# Patient Record
(demographics unavailable — no encounter records)

---

## 2025-01-13 NOTE — CONSULT LETTER
[Dear  ___] : Dear  [unfilled], [Consult Letter:] : I had the pleasure of evaluating your patient, [unfilled]. [Please see my note below.] : Please see my note below. [Consult Closing:] : Thank you very much for allowing me to participate in the care of this patient.  If you have any questions, please do not hesitate to contact me. [Sincerely,] : Sincerely, [FreeTextEntry2] : Dr. Tenisha Leger  [address] [place] [id number]  [FreeTextEntry3] : Tanya Kendrick MD   Pediatric Otolaryngology/ Head & Neck Surgery [facility] , Otolaryngology; [facility]  [facility] [place] [address] Tel [phone] Fax [phone]

## 2025-01-13 NOTE — BIRTH HISTORY
[At ___ Weeks Gestation] : at [unfilled] weeks gestation [ Section] : by  section [None] : No maternal complications [Passed] : passed [de-identified] : Twin delivery and heart deceleration  [de-identified] : NO NICU

## 2025-01-13 NOTE — HISTORY OF PRESENT ILLNESS
[de-identified] : This child presents with noisy breathing since 1 week after birth (?inspiratory stridor).   It has worsened especially with feeds, agitation, and laying flat.  The patient does not have spit ups.  There is no history of snoring, mouth breathing or witnessed apnea. No throat/tonsil infections. No problems with swallowing or with VPI/Speech/nasal regurgitation.  Passed NBHT AU. Full term,  uncomplicated delivery with uncomplicated pregnancy. No cyanosis, no ETT intubation, no home oxygen requirement, no NICU stay.

## 2025-01-13 NOTE — ASSESSMENT
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 At this time I would plan to proceed with expectant management (i explained the r/b/a of head of bed elevation) and follow up in 2-3 months or sooner if symptoms worsen (can consider high dose 5mg/kg BID Axid/pepcid/feeding evaluation vs. higher dose nizatidine or PPI if needed/supraglottoplasty if worsening). RTC sooner if warning signs/symptoms worsen (can go to ER if sxs such as worsening SOB/resp distress/cyanosis). I gave an instructional sheet for lifestyle modifications and teaching about the condition.    fu as planned scope at fu given significant arytenoid hooding.

## 2025-02-07 NOTE — CONSULT LETTER
[FreeTextEntry2] : Dr. Tenisha Leger  [address] [place] [id number]   [FreeTextEntry3] : Tanya Kendrick MD   Pediatric Otolaryngology/ Head & Neck Surgery [facility] , Otolaryngology; [facility]  [facility] [place] [address] Tel [phone] Fax [phone]

## 2025-02-07 NOTE — ASSESSMENT
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 Given no improvement in sxs I do recommend a SLP and GI team eval. Pulm team eval if sleep sxs worsen and fu as planned scope at fu given significant arytenoid hooding Will d/w GI/pcp regarding addition of pepcid.

## 2025-02-07 NOTE — HISTORY OF PRESENT ILLNESS
[de-identified] : This child presents for a follow up of laryngomalacia Mom feels stridor feels worse from last visit It is now disturbing her when she is sleeping She has not been finishing her bottle within the last week - she typically leaves around 1oz. however still gaining weight The patient does not have spit ups. There is occasional mouth breathing  Mom feels shes fighting to catch her breath when sleeping but unsure if there are any apneas  No throat/tonsil infections. No problems with swallowing or with VPI/Speech/nasal regurgitation. Passed NBHT AU. Full term,  uncomplicated delivery with uncomplicated pregnancy. No cyanosis, no ETT intubation, no home oxygen requirement, no NICU stay.
MD

## 2025-02-13 NOTE — HISTORY OF PRESENT ILLNESS
[Mother] : mother [Home] : at home, [unfilled] , at the time of the visit. [Medical Office: ([facility])___] : at the medical office located in  [Telehealth (audio & video)] : This visit was provided via telehealth using real-time 2-way audio visual technology. [FreeTextEntry3] : mother [de-identified] : 2 month old born 36 weeks and 1 day, twin A, no complications, no NICU stay required, passed meconium in 24 hours of life with tracheomalacia presents for presents for GI portion of CARE (comprehensive, airway, respiratory and esophageal) team evaluation.  Feeding enfamil gentle ease 1-3oz/feed every 3 hours, may skip a feed overnight. Evaluated by SLP 2/15 who felt signs of LEONARD and asked to use pulverized oatmeal cereal 2 tsp in 3oz bottle twice daily. Doing well with those thickened bottles. Uncomfortable with feeds resolved with thickening. May make bubbles of saliva. No reflux, regurgitation, spitting. Famotidine started this morning 0.5mL once daily. BM 2-3 times a day, watery to mushy, no visible blood or mucous. No URI, AOM, PNA, throat infections, croup, sinusitis. Generally a happy baby.

## 2025-02-13 NOTE — ASSESSMENT
[Educated Patient & Family about Diagnosis] : educated the patient and family about the diagnosis [FreeTextEntry1] : 2 month old born 36 weeks and 1 day, twin A, no complications, no NICU stay required, passed meconium in 24 hours of life presents for presents for GI portion of CARE (comprehensive, airway, respiratory and esophageal) team evaluation.  Recommend: -LEONARD precautions -Change Famotidine to 0.3mL twice daily, will refill when required -Thicken all bottles 2 tsp to 3oz of formula -Consider hydrolyzed formula if issues persist -Weight gain appears excellent if suboptimal at PMD appointment will concentrate to 24kcal/mL -Call with questions, concerns, or clinical change -Follow-up in 8 weeks in office or telehealth ok as long as accurate and recent weight available

## 2025-02-13 NOTE — CONSULT LETTER
[Dear  ___] : Dear  [unfilled], [Consult Letter:] : I had the pleasure of evaluating your patient, [unfilled]. [Please see my note below.] : Please see my note below. [Consult Closing:] : Thank you very much for allowing me to participate in the care of this patient.  If you have any questions, please do not hesitate to contact me. [Sincerely,] : Sincerely, [DrJuana  ___] : Dr. CARVAJAL [FreeTextEntry3] : Jhonny Godinez DO, MSc   of Comprehensive Airway, Respiratory and Esophageal Team Division of Pediatric Gastroenterology, Liver Disease and Nutrition [facility]

## 2025-02-13 NOTE — PHYSICAL EXAM
[Well Developed] : well developed [Well Nourished] : well nourished [NAD] : in no acute distress [Alert and Active] : alert and active [Rectal Exam Deferred] : rectal exam was deferred [Normal Tone] : normal tone [Well-Perfused] : well-perfused [Interactive] : interactive [icteric] : anicteric [Respiratory Distress] : no respiratory distress  [Distended] : non distended [Rash] : no rash [Jaundice] : no jaundice [FreeTextEntry4] : noisy breathing

## 2025-02-25 NOTE — HISTORY OF PRESENT ILLNESS
[FreeTextEntry1] : CHRISTIANO is a 2 month old girl with laryngomalacia (stridor) here for evaluation. Born at 36 weeks, noisy breathing since 1wo, worsens with agitation and laying down.  INITIAL HISTORY 2/24/2025 ENT: seen by Dr. Kendrick, laryngomalacia present. Ongoing evaluation and decision for scope, possible CARE triple scope. GI: seen by Dr. Godinez, increased Famotidine dose. Being considered for CARE procedure/triple scope. ST: switched to cereal consistency. PULM: denies any colds/URI since birth. No baseline cough, difficult or fast breathing.   INITIAL RESPIRATORY HISTORY 02/23/2025 - Respiratory symptoms when well: Noisy breathing, sometimes pulling in chest and neck. - Bacterial infections/Antibiotics: Denies - Hospitalizations/ER visits: ED for retracting before dx of laryngomalacia - Albuterol use: Denies - Steroids use: Denies - Reflux/Aspiration symptoms: + on Famotidine - Vaccination status: Up to date - Sleep symptoms: Typical for infant  - Previous intubation: Denies - Gestational age - Development: 36 weeks - Feeding regimen: Enfamil Emily ease  - Medications taking: Famotidine, Nystatin  - [facility] asthma: Mom and Grandma - Allergy symptoms (chronic nasal congestion): Denies - Eczema: + per pediatrician

## 2025-02-25 NOTE — PHYSICAL EXAM
[Well Nourished] : well nourished [Well Developed] : well developed [Alert] : ~L alert [Active] : active [Normal Breathing Pattern] : normal breathing pattern [No Respiratory Distress] : no respiratory distress [No Allergic Shiners] : no allergic shiners [No Drainage] : no drainage [No Conjunctivitis] : no conjunctivitis [No Oral Pallor] : no oral pallor [No Oral Cyanosis] : no oral cyanosis [Absence Of Retractions] : absence of retractions [Symmetric] : symmetric [Good Expansion] : good expansion [No Acc Muscle Use] : no accessory muscle use [Good aeration to bases] : good aeration to bases [Equal Breath Sounds] : equal breath sounds bilaterally [No Crackles] : no crackles [No Rhonchi] : no rhonchi [No Wheezing] : no wheezing [Normal Sinus Rhythm] : normal sinus rhythm [No Heart Murmur] : no heart murmur [Soft, Non-Tender] : soft, non-tender [No Hepatosplenomegaly] : no hepatosplenomegaly [Non Distended] : was not ~L distended [Abdomen Mass (___ Cm)] : no abdominal mass palpated [Full ROM] : full range of motion [No Clubbing] : no clubbing [Capillary Refill < 2 secs] : capillary refill less than two seconds [No Cyanosis] : no cyanosis [No Petechiae] : no petechiae [No Kyphoscoliosis] : no kyphoscoliosis [No Contractures] : no contractures [Alert and  Oriented] : alert and oriented [No Abnormal Focal Findings] : no abnormal focal findings [Normal Muscle Tone And Reflexes] : normal muscle tone and reflexes [No Birth Marks] : no birth marks [No Rashes] : no rashes [No Skin Lesions] : no skin lesions [FreeTextEntry5] : +supine: pronounced inspiratory stridor. [FreeTextEntry6] : +no tachypnea, retractions or difficulty breathing. [FreeTextEntry7] : +clear lung sounds, aside from referred upper respiratory sounds

## 2025-02-25 NOTE — DATA REVIEWED
[FreeTextEntry1] : I personally reviewed records, documentation and images (findings included into my note), including: - Records from Dr. Tanya Kendrick dated 2/7/2025. - Records from Dr. Jhonny Godinez dated 2/13/2025. - No images to review.

## 2025-02-25 NOTE — ASSESSMENT
[FreeTextEntry1] : CHRISTIANO, 2 month old with laryngomalacia referred for evaluation. Patient's history and physical exam are consistent with laryngomalacia without any lower respiratory involvement. Denies any recurring cough, wheezing or difficulty breathing. No viral infections up to now. Asthma risk factors may increase risk for the development of Reactive Airway Disease (RAD)/Asthma. While patient's history so far does not support RAD, I will have low threshold to start bronchodilators if patient were to develop respiratory distress related with wheezing.   Laryngomalacia diagnosed by ENT with dysphagia, now on thickened diet, and potential aspiration. Patient benefits from the use of antacids as prescribed by GI. Ongoing evaluation by ENT to determine the need for triple scope/CARE procedure. Low suspicion for lower airway abnormalities, regardless a flexible bronchoscopy may be considered pending further evaluation.   Today's thorough evaluation suggests no other respiratory pathology; alternative diagnosis may still be considered. Discussed above assessment, management plan and potential medication side effects. Parent agreed with plan. All queries were answered. Evaluation includes normal saturation. Time excludes separately reported services.   Recommend: - Continue management per ENT. - Agree with thickened feeds and antacids per ST and GI. - Monitor evolution. Determining need for CARE/Triple scope procedure. - Low threshold for bronchodilator initiation PRN if presents signs or symptoms of reactive airway. Discussed benefits/risks of flexible bronchoscopies. - Follow-up in 6 months.

## 2025-02-25 NOTE — REASON FOR VISIT
[Initial Evaluation] : an initial evaluation of [Mother] : mother [Other: _____] : [unfilled] [FreeTextEntry2] : laryngomalacia, stridor

## 2025-02-25 NOTE — CONSULT LETTER
[Dear  ___] : Dear  [unfilled], [Consult Letter:] : I had the pleasure of evaluating your patient, [unfilled]. [Please see my note below.] : Please see my note below. [Consult Closing:] : Thank you very much for allowing me to participate in the care of this patient.  If you have any questions, please do not hesitate to contact me. [Sincerely,] : Sincerely, [FreeTextEntry3] : Wero Monte MD Attending Physician, Division of Pediatric Pulmonology.

## 2025-02-25 NOTE — HISTORY OF PRESENT ILLNESS
[FreeTextEntry1] : CHRISTIANO is a 2 month old girl with laryngomalacia (stridor) here for evaluation. Born at 36 weeks, noisy breathing since 1wo, worsens with agitation and laying down.  INITIAL HISTORY 2/24/2025 ENT: seen by Dr. Kendrick, laryngomalacia present. Ongoing evaluation and decision for scope, possible CARE triple scope. GI: seen by Dr. Godinez, increased Famotidine dose. Being considered for CARE procedure/triple scope. ST: switched to cereal consistency. PULM: denies any colds/URI since birth. No baseline cough, difficult or fast breathing.   INITIAL RESPIRATORY HISTORY 02/23/2025 - Respiratory symptoms when well: Noisy breathing, sometimes pulling in chest and neck. - Bacterial infections/Antibiotics: Denies - Hospitalizations/ER visits: ED for retracting before dx of laryngomalacia - Albuterol use: Denies - Steroids use: Denies - Reflux/Aspiration symptoms: + on Famotidine - Vaccination status: Up to date - Sleep symptoms: Typical for infant  - Previous intubation: Denies - Gestational age - Development: 36 weeks - Feeding regimen: Enfamil Emily ease  - Medications taking: Famotidine, Nystatin  - [facility] asthma: Mom and Grandma - Allergy symptoms (chronic nasal congestion): Denies - Eczema: + per pediatrician

## 2025-03-10 NOTE — HISTORY OF PRESENT ILLNESS
[de-identified] : This child presents for a follow up of laryngomalacia Mom feels stridor is improving. GI recently prescribed thickened formula. Continues on famotidine  Sleep is improving.  Finishing bottles better. The patient does not have spit ups. There is occasional mouth breathing Occasionally still trying to catch breath, but no other noted dyspnea.  Denies recent fevers or infections  No throat/tonsil infections. No problems with swallowing or with VPI/Speech/nasal regurgitation. Passed NBHT AU. Full term, uncomplicated delivery with uncomplicated pregnancy. No cyanosis, no ETT intubation, no home oxygen requirement, no NICU stay.

## 2025-03-10 NOTE — CONSULT LETTER
[Dear  ___] : Dear  [unfilled], [Consult Letter:] : I had the pleasure of evaluating your patient, [unfilled]. [Please see my note below.] : Please see my note below. [Consult Closing:] : Thank you very much for allowing me to participate in the care of this patient.  If you have any questions, please do not hesitate to contact me. [Sincerely,] : Sincerely, [FreeTextEntry2] :     Dr. Tenisha Leger  [address] [place] [id number] [FreeTextEntry3] : Tanya Kendrick MD   Pediatric Otolaryngology/ Head & Neck Surgery [facility] , Otolaryngology; [facility]  [facility] [place] [address] Tel [phone] Fax [phone]

## 2025-03-10 NOTE — ASSESSMENT
[FreeTextEntry1] : This child presents with laryngomalacia and sig signs of LPR but improving sxs on faotidine and thickened diet fu as planned given improvement. given cannot see full VF mobility with swelling will scope at fu.

## 2025-03-10 NOTE — REASON FOR VISIT
[Subsequent Evaluation] : a subsequent evaluation for [FreeTextEntry2] : stridor and noisy breathing  [Mother] : mother

## 2025-04-16 NOTE — HISTORY OF PRESENT ILLNESS
[Mother] : mother [Home] : at home, [unfilled] , at the time of the visit. [Medical Office: ([facility])___] : at the medical office located in  [Telehealth (audio & video)] : This visit was provided via telehealth using real-time 2-way audio visual technology. [FreeTextEntry3] : mother [de-identified] : 2 month old born 36 weeks and 1 day, twin A, no complications, no NICU stay required, passed meconium in 24 hours of life with tracheomalacia presents for presents for GI portion of CARE (comprehensive, airway, respiratory and esophageal) team evaluation.  -LEONARD precautions -Change Famotidine to 0.3mL twice daily, will refill when required -Thicken all bottles 2 tsp to 3oz of formula -Consider hydrolyzed formula if issues persist -Call with questions, concerns, or clinical change -Follow-up in 8 weeks in office or telehealth ok as long as accurate and recent weight available   Feeding enfamil gentle ease 1-3oz/feed every 3 hours, may skip a feed overnight. Evaluated by SLP 2/15 who felt signs of LEONARD and asked to use pulverized oatmeal cereal 2.5 tsp in 4oz bottle, 5-6 bottles daily. Doing well with those thickened bottles. Uncomfortable with feeds resolved with thickening. May make bubbles of saliva. No reflux, regurgitation, spitting. Famotidine started this morning 0.3mL once daily. BM 2-3 times a day, watery to mushy, no visible blood or mucous. No URI, AOM, PNA, throat infections, croup, sinusitis. Generally a happy baby.

## 2025-04-16 NOTE — CONSULT LETTER
[Dear  ___] : Dear  [unfilled], [Consult Letter:] : I had the pleasure of evaluating your patient, [unfilled]. [Please see my note below.] : Please see my note below. [Consult Closing:] : Thank you very much for allowing me to participate in the care of this patient.  If you have any questions, please do not hesitate to contact me. [Sincerely,] : Sincerely, [DrJuana  ___] : Dr. CARVAJAL [FreeTextEntry3] : Jhonny Godinez DO, MSc   of Comprehensive Airway, Respiratory and Esophageal Team Division of Pediatric Gastroenterology, Liver Disease and Nutrition [facility]

## 2025-04-16 NOTE — ASSESSMENT
[Educated Patient & Family about Diagnosis] : educated the patient and family about the diagnosis [FreeTextEntry1] : 2 month old born 36 weeks and 1 day, twin A, no complications, no NICU stay required, passed meconium in 24 hours of life presents for presents for GI portion of CARE (comprehensive, airway, respiratory and esophageal) team evaluation.  Recommend: -LEONARD precautions -Increase Famotidine to 0.3mL and 0.4mL -Thicken all bottles -Consider puree at 4 months corrected -Weight gain appears excellent if suboptimal at PMD appointment will concentrate to 24kcal/mL -Call with questions, concerns, or clinical change -Follow-up in 8 weeks in office or telehealth ok as long as accurate and recent weight available

## 2025-06-09 NOTE — CONSULT LETTER
Pharmacy requesting refill on Lasix   Last seen 5/8/25  Has appt 6/10/25       [Dear  ___] : Dear  [unfilled], [Consult Letter:] : I had the pleasure of evaluating your patient, [unfilled]. [Please see my note below.] : Please see my note below. [Consult Closing:] : Thank you very much for allowing me to participate in the care of this patient.  If you have any questions, please do not hesitate to contact me. [Sincerely,] : Sincerely, [FreeTextEntry3] : Tanya Kendrick MD   Pediatric Otolaryngology/ Head & Neck Surgery [facility] , Otolaryngology; [facility]  [facility] [place] [address] Tel [phone] Fax [phone]   [FreeTextEntry2] : Dr. Tenisha Leger  [address] [place] [phone]

## 2025-06-09 NOTE — REASON FOR VISIT
[Subsequent Evaluation] : a subsequent evaluation for [Mother] : mother [FreeTextEntry2] : stridor and noisy breathing

## 2025-06-09 NOTE — ASSESSMENT
[FreeTextEntry1] : This child presents with a history of laryngomalacia  I have also discussed with the family:      1.  A sleep study/overnight polysomnogram evaluation vs. observation.       2.  Given the patient's corresponding history and physical examination findings, I think that it is reasonable to proceed with a supraglottoplasty if symptoms persist. I have explained the risks of supraglottoplasty  including but not limited to general anesthesia, bleeding, infection, failure to extubate, injury to the teeth/lips/gums/local structures, recurrent laryngomalacia, persistence of symptoms, dysphagia, swallowing dysfunction, continuaed aspiration/penetration, stenosis,  post-operative hemorrhage, prolonged intubation, possible need for further procedures. I have discussed with the family and offered two options to proceed.  The family opts for a preop MBSS, SLP eval and if no improvement in clinical worsening post cold consider SGP.  May need thickend liquids despite surgery. If surgery booked can ask Dr. Godinez to join,  If 8 weeks post cold doing much better consider just observation on thickened diet and famotidine.

## 2025-06-09 NOTE — HISTORY OF PRESENT ILLNESS
[de-identified] : 6 month old here for a follow up of laryngomalacia Mom feels stridor is improving. GI recently prescribed thickened formula. Continues on famotidine daily. Sleep improved but recently started waking up a few times during the night. Feeding has improved and started on purees  The patient does not have spit ups. Coughing/choking right after completing formula since her cold 1 month ago. Does not do this with purees Occasionally still trying to catch breath, but no other noted dyspnea.  Denies recent fevers or infections  No throat/tonsil infections. No problems with swallowing or with VPI/Speech/nasal regurgitation. Passed NBHT AU. Full term, uncomplicated delivery with uncomplicated pregnancy. No cyanosis, no ETT intubation, no home oxygen requirement, no NICU stay.

## 2025-06-18 NOTE — ASSESSMENT
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 Do not use for more than 2 weeks at a time, with 1 week off in between. SED including skin thinning, atrophy, stretch marks, steroid acne. - gentle skincare handout reviewed

## 2025-06-18 NOTE — PHYSICAL EXAM
[FreeTextEntry3] : hypogimented, whorled patches on the abdomen, groin and L thigh rough patches on the cheeks and arms xerosis

## 2025-06-18 NOTE — HISTORY OF PRESENT ILLNESS
[FreeTextEntry1] : npa - light spots [de-identified] : Pt is a 6 month old F presenting for initial eval of light spots on abdomen and L inner thigh that have been present since birth. Pt's fraternal twin and dad have similar lesions in other areas of the body.

## 2025-06-18 NOTE — CONSULT LETTER
[FreeTextEntry3] : Alistair Falk MD Pediatric Dermatology [facility]

## 2025-06-18 NOTE — HISTORY OF PRESENT ILLNESS
[FreeTextEntry1] : npa - light spots [de-identified] : Pt is a 6 month old F presenting for initial eval of light spots on abdomen and L inner thigh that have been present since birth. Pt's fraternal twin and dad have similar lesions in other areas of the body.

## 2025-06-24 NOTE — HISTORY OF PRESENT ILLNESS
[FreeTextEntry1] : REPORTED FEEDING/SWALLOWING CONCERNS:  Reported by mother to include: -Noisy breathing had decreased, following cold, noisy breathing return, cold was at the end of march -Denied spitting up, continuing thickening  BIRTH & MEDICAL HISTORY: Birth and Medical history gathered via parent interview and through review of electronic medical record. Patient born at 36 weeks via  section, twin delivery. No pregnancy nor delivery complications reported. No prior hospitalizations or surgeries reported. Medical history significant for laryngomalacia, gastroesophageal reflux, and feeding difficulties. Per referring otolaryngology note (25), "The fiberoptic laryngoscope was passed via bilateral nasal cavities...The hypopharynx was clear with no lesions or masses. The visualized portion of the vocal folds were harder to see today vs first visit with evidence of moderate-serv laryngomalacia (foreshortened aryepiglottic folds, omega epiglottis, and redundant cuneiform mucosa and post cricoid cushion obstructing full VF visualization)." Current Respiratory Status: Room Air History of Intubation:  None reported Medications: Famotidine 2x/daily  Medical allergies: None reported  Food Allergies: None reported  Food intolerances: None reported    Reported therapeutic history: None reported   Results of Previous Modified Barium Swallow Study (MBSS)/Videofluoroscopic Swallow Studies (VFSS): None reported  Results of Previous Clinical swallow evaluations: Most recently seen by this department on 3/10/2025. Please see [employer] chart for details.    FEEDING HISTORY: Current Diet (based on the International Dysphagia Diet Standardization initiative [IDDSI]):  Current nutritional intake: Exclusive oral diet of Thickened formula in a ratio of 3 1/2 teaspoon cereal to 5-6 ounces formula and  Puree Consistency (IDDSI Level 4) Current formula if applicable: Gentlease  Feeding utensils: Kemi Level 3 Feeding schedule: 5-6 ounces every 3-5 and 2 purees, in a day will do ~4 bottles and 2 puree feedings (8 ounces of puree) Length of time taken to complete a feeding: <5 minutes Reported Endurance During Meals:  Good  History of Feeding Tube:  None reported

## 2025-06-24 NOTE — ASSESSMENT
[FreeTextEntry1] : ASSESSMENT  Mental Status: Alert, Responsive and Cooperative  POSTURAL CONTROL & MOVEMENT PATTERN: Head Control: WFL Trunk Control: WFL Involuntary movement (chorea, dystonia, fasciculation, myoclonus, spasms, tremor): Not observed  ORAL MOTOR ASSESSMENT: A cursory oral mechanism examination of was conducted. Patient presented with facial symmetry and a predominantly closed mouth posture while at rest. Dentition: Within functional limits for age Oral Mucosa: Moist Buccal: Within functional limits Laryngeal: Within functional limits Palate: Within functional limits Velar function: Intact Labial: Within functional limits Lingual: Within functional limits Grooving/cupping of tongue to finger (dry spoon): Present Testing Tongue Position: Within functional limits Oral Sensory: Within functional limits Vocal Quality was perceptually judged to be within functional limits based on spontaneous vocalizations. Reflexes: Age appropriate reflexes: The following reflexes were present upon clinician elicitation: NNS upon gloved finger, phasic bite, traverse tongue, lingual protrusion. Gag reflex: At this time, clinician did not elicit gag reflex. Abnormal reflexes: None  ASSESSMENT: The patient was assessed seated semi-upright in parent's lap. Patient's caregiver was present throughout, and they served as the feeder during today's exam. Respiratory Status during Evaluation: Room air, noisy breathing observed at baseline in child diagnosed with laryngomalacia Secretion Management: Age Appropriate There was no coughing, no throat clearing, and no wet/gurgly vocal quality prior to PO administration. The patient was alert and cooperative. Special Testing Considerations/Adaptive Equipment: N/A  Feeding Assessment:  Solids Trials: Consistencies Administered: Pureed (Level 4) Modality of administration/utensils: Infant spoon; place on molar surface Feeding Method: Fed by caregiver and clinician Endurance during meal/trials: Good Patient required minimal cues to complete testing/evaluation.  Oral Preparatory Stage for Solids: Within functional limits Patient's oral preparatory phase was marked by [X] Mild anterior loss of bolus (age appropriate due to emerging oral stage skills for puree) [X] Age appropriate spoon feeding skills [X] Adequate acceptance (e.g., mouth opening to spoon, leaning forward) labial stripping appropriate for age  Oral Phase for Solids: Within functional limits Patient's oral phase was marked by [X] Adequate anterior posterior movement of bolus [X] Timely oral transit time [X] Adequate clearance of bolus from oral cavity  Pharyngeal Stage: Solids Within functional limits: Yes Initiation of swallow based on digital palpation and judged to be: Within functional limits Hyo-laryngeal elevation based on digital palpation: Within functional limits Overt signs and symptoms of penetration/aspiration: Not evidenced Cardiopulmonary Changes: Not demonstrated; patient with baseline noisy breathing that did not worsen during feed  Fluid Trial 1: Consistencies Administered: Mildly Thick (Level 2) via Kemi Level 3 nipple (3.5 tsp cereal per 5 oz formula). IDDSI flowtest completed on home regimen with 5.5ml remaining in 10ml syringe after flowing for 10 seconds, corresponding to  Mildly Thick Fluids (IDDSI Level 2) Modality of administration/utensils: Kemi Level 3 nipple Feeding Method: Fed by caregiver Endurance during meal/trials: Good Patient required minimal cues to complete testing/evaluation.  Oral Stages for Fluids:  Patient's oral preparatory phase was marked by [X] Adequate acceptance   [X] Adequate anterior posterior movement of bolus [X] Timely oral transit time [X] Adequate clearance of bolus from oral cavity  Pharyngeal Stage: Fluids Within functional limits: [X] Yes Initiation of swallow based on [X] digital palpation and judged to be: [X] Prompt Hyo-laryngeal elevation based on digital palpation: [X] Within functional limits Cardiopulmonary Changes: Not demonstrated; patient with baseline noisy breathing that did not worsen during feed Overt signs and symptoms of penetration/aspiration: Delayed throat clear post oral intake  Fluid Trial 2: Consistencies Administered: Mildly Thick (Level 2) via Kemi Level 3 nipple (1 tsp cereal per 1 oz formula) Modality of administration/utensils: Kemi Level 3 nipple Feeding Method: Fed by caregiver Endurance during meal/trials: Good Patient required minimal cues to complete testing/evaluation.   Oral Stages for Fluids: Within functional limits [X] Adequate acceptance [X] Adequate anterior posterior movement of bolus [X] Timely oral transit time [X] Adequate clearance of bolus from oral cavity  Pharyngeal Stage: Fluids Within functional limits: [X] Yes Initiation of swallow based on [X] digital palpation and judged to be: [X] Prompt Hyo-laryngeal elevation based on digital palpation: [X] Within functional limits Cardiopulmonary Changes: Not demonstrated; patient with baseline noisy breathing that did not worsen during feed Overt signs and symptoms of penetration/aspiration: Not evidenced  PROGNOSIS: Prognosis is good for safe and adequate oral intake of the recommended consistencies as outlined below, based on the results of today's assessment and the medical history reported.  EDUCATION: Discussed results of evaluation with Mother who expressed understanding of the clinical evaluation and agreement to Modified Barium Swallow Study.  IMPRESSIONS: Patient, a 6 month old, full term infant was seen for a Clinical Swallow Evaluation to assess feeding/swallowing difficulties given history of laryngomalacia and gastroesophageal reflux. Patient presents with functional oral and pharyngeal phase with both liquids and swallow; however, noted with delayed throat clear during trial home regimen of home feeding regimen of 3.5 tsp cereal to 5 ounces fluid which corresponds to  Mildly Thick Fluids (IDDSI Level 2) according to IDDSI flowtesting. Overt s/s of penetration/aspiration were remediated with increased viscosity of Thickened formula in a ratio of 1 teaspoon cereal to 1 ounce formula via Kemi Level 3 nipple. Given today's evaluation and medical history, plan for  oral diet of Thickened formula in a ratio of 1 teaspoon cereal to 1 ounce formula via Kemi Level 3 nipple and for further objective swallow evaluation via Modified Barium Swallow Study. Patient is currently scheduled for 7/3/25. Reviewed MBSS preparation with mother at time of CDE this date.   DIET/FLUID RECOMMENDED CONSISTENCIES: Initiate oral feedings of Mildly Thickened Liquids (IDDSI Level 2), 1 teaspoon cereal per 1 ounce of formula via Kemi Level 3 nipple. Continue oral feedings of  Puree Consistency (IDDSI Level 4)  ADDITIONAL RECOMMENDATIONS: MBSS/VFSS is recommended: Yes 1. Modified Barium Swallow Study/Videofluoroscopic Swallow Study (MBS/VFSS) is recommended to objectively assess oropharyngeal swallow function for liquids  Swallowing/Feeding therapy: 1. Further therapeutic services pending results of Modified Barium Swallow Study  Other recommended referrals: Follow up with Otolaryngology, Gastroenterology, and Pediatrician as scheduled  Aspiration precautions: Monitor for signs and symptoms of aspiration and or laryngeal penetration, such as change of breathing pattern, cough, throat clearing, gurgly/wet voice, color change, fever, pneumonia, and upper respiratory infection. If noted: Discontinue oral intake and contact physician immediately.  This referral process was reviewed with the parent. No further recommendations were made at this time. Please feel free to contact the Center at [phone], if any additional information is needed.   Kasia Laws M.A. CCC-SLP [place] License #[id number]   References  GIANCARLO Khan, & DEIDRE Stinson (2001). Pediatric Swallowing and Feeding Assessment and Management (2nd ed.). [employer].   MARK Mike, & KENYA Vazquez (2000). Pre-feeding skills: A comprehensive resource for mealtime development. [employer].

## 2025-06-24 NOTE — ASSESSMENT
[FreeTextEntry1] : ASSESSMENT  Mental Status: Alert, Responsive and Cooperative  POSTURAL CONTROL & MOVEMENT PATTERN: Head Control: WFL Trunk Control: WFL Involuntary movement (chorea, dystonia, fasciculation, myoclonus, spasms, tremor): Not observed  ORAL MOTOR ASSESSMENT: A cursory oral mechanism examination of was conducted. Patient presented with facial symmetry and a predominantly closed mouth posture while at rest. Dentition: Within functional limits for age Oral Mucosa: Moist Buccal: Within functional limits Laryngeal: Within functional limits Palate: Within functional limits Velar function: Intact Labial: Within functional limits Lingual: Within functional limits Grooving/cupping of tongue to finger (dry spoon): Present Testing Tongue Position: Within functional limits Oral Sensory: Within functional limits Vocal Quality was perceptually judged to be within functional limits based on spontaneous vocalizations. Reflexes: Age appropriate reflexes: The following reflexes were present upon clinician elicitation: NNS upon gloved finger, phasic bite, traverse tongue, lingual protrusion. Gag reflex: At this time, clinician did not elicit gag reflex. Abnormal reflexes: None  ASSESSMENT: The patient was assessed seated semi-upright in parent's lap. Patient's caregiver was present throughout, and they served as the feeder during today's exam. Respiratory Status during Evaluation: Room air, noisy breathing observed at baseline in child diagnosed with laryngomalacia Secretion Management: Age Appropriate There was no coughing, no throat clearing, and no wet/gurgly vocal quality prior to PO administration. The patient was alert and cooperative. Special Testing Considerations/Adaptive Equipment: N/A  Feeding Assessment:  Solids Trials: Consistencies Administered: Pureed (Level 4) Modality of administration/utensils: Infant spoon; place on molar surface Feeding Method: Fed by caregiver and clinician Endurance during meal/trials: Good Patient required minimal cues to complete testing/evaluation.  Oral Preparatory Stage for Solids: Within functional limits Patient's oral preparatory phase was marked by [X] Mild anterior loss of bolus (age appropriate due to emerging oral stage skills for puree) [X] Age appropriate spoon feeding skills [X] Adequate acceptance (e.g., mouth opening to spoon, leaning forward) labial stripping appropriate for age  Oral Phase for Solids: Within functional limits Patient's oral phase was marked by [X] Adequate anterior posterior movement of bolus [X] Timely oral transit time [X] Adequate clearance of bolus from oral cavity  Pharyngeal Stage: Solids Within functional limits: Yes Initiation of swallow based on digital palpation and judged to be: Within functional limits Hyo-laryngeal elevation based on digital palpation: Within functional limits Overt signs and symptoms of penetration/aspiration: Not evidenced Cardiopulmonary Changes: Not demonstrated; patient with baseline noisy breathing that did not worsen during feed  Fluid Trial 1: Consistencies Administered: Mildly Thick (Level 2) via Kemi Level 3 nipple (3.5 tsp cereal per 5 oz formula). IDDSI flowtest completed on home regimen with 5.5ml remaining in 10ml syringe after flowing for 10 seconds, corresponding to  Mildly Thick Fluids (IDDSI Level 2) Modality of administration/utensils: Kemi Level 3 nipple Feeding Method: Fed by caregiver Endurance during meal/trials: Good Patient required minimal cues to complete testing/evaluation.  Oral Stages for Fluids:  Patient's oral preparatory phase was marked by [X] Adequate acceptance   [X] Adequate anterior posterior movement of bolus [X] Timely oral transit time [X] Adequate clearance of bolus from oral cavity  Pharyngeal Stage: Fluids Within functional limits: [X] Yes Initiation of swallow based on [X] digital palpation and judged to be: [X] Prompt Hyo-laryngeal elevation based on digital palpation: [X] Within functional limits Cardiopulmonary Changes: Not demonstrated; patient with baseline noisy breathing that did not worsen during feed Overt signs and symptoms of penetration/aspiration: Delayed throat clear post oral intake  Fluid Trial 2: Consistencies Administered: Mildly Thick (Level 2) via Kemi Level 3 nipple (1 tsp cereal per 1 oz formula) Modality of administration/utensils: Kemi Level 3 nipple Feeding Method: Fed by caregiver Endurance during meal/trials: Good Patient required minimal cues to complete testing/evaluation.   Oral Stages for Fluids: Within functional limits [X] Adequate acceptance [X] Adequate anterior posterior movement of bolus [X] Timely oral transit time [X] Adequate clearance of bolus from oral cavity  Pharyngeal Stage: Fluids Within functional limits: [X] Yes Initiation of swallow based on [X] digital palpation and judged to be: [X] Prompt Hyo-laryngeal elevation based on digital palpation: [X] Within functional limits Cardiopulmonary Changes: Not demonstrated; patient with baseline noisy breathing that did not worsen during feed Overt signs and symptoms of penetration/aspiration: Not evidenced  PROGNOSIS: Prognosis is good for safe and adequate oral intake of the recommended consistencies as outlined below, based on the results of today's assessment and the medical history reported.  EDUCATION: Discussed results of evaluation with Mother who expressed understanding of the clinical evaluation and agreement to Modified Barium Swallow Study.  IMPRESSIONS: Patient, a 6 month old, full term infant was seen for a Clinical Swallow Evaluation to assess feeding/swallowing difficulties given history of laryngomalacia and gastroesophageal reflux. Patient presents with functional oral and pharyngeal phase with both liquids and swallow; however, noted with delayed throat clear during trial home regimen of home feeding regimen of 3.5 tsp cereal to 5 ounces fluid which corresponds to  Mildly Thick Fluids (IDDSI Level 2) according to IDDSI flowtesting. Overt s/s of penetration/aspiration were remediated with increased viscosity of Thickened formula in a ratio of 1 teaspoon cereal to 1 ounce formula via Kemi Level 3 nipple. Given today's evaluation and medical history, plan for  oral diet of Thickened formula in a ratio of 1 teaspoon cereal to 1 ounce formula via Kemi Level 3 nipple and for further objective swallow evaluation via Modified Barium Swallow Study. Patient is currently scheduled for 7/3/25. Reviewed MBSS preparation with mother at time of CDE this date.   DIET/FLUID RECOMMENDED CONSISTENCIES: Initiate oral feedings of Mildly Thickened Liquids (IDDSI Level 2), 1 teaspoon cereal per 1 ounce of formula via Kemi Level 3 nipple. Continue oral feedings of  Puree Consistency (IDDSI Level 4)  ADDITIONAL RECOMMENDATIONS: MBSS/VFSS is recommended: Yes 1. Modified Barium Swallow Study/Videofluoroscopic Swallow Study (MBS/VFSS) is recommended to objectively assess oropharyngeal swallow function for liquids  Swallowing/Feeding therapy: 1. Further therapeutic services pending results of Modified Barium Swallow Study  Other recommended referrals: Follow up with Otolaryngology, Gastroenterology, and Pediatrician as scheduled  Aspiration precautions: Monitor for signs and symptoms of aspiration and or laryngeal penetration, such as change of breathing pattern, cough, throat clearing, gurgly/wet voice, color change, fever, pneumonia, and upper respiratory infection. If noted: Discontinue oral intake and contact physician immediately.  This referral process was reviewed with the parent. No further recommendations were made at this time. Please feel free to contact the Center at [phone], if any additional information is needed.   Kasia Laws M.A. CCC-SLP [place] License #[id number]   References  GIANCARLO Khan, & DEIDRE Stinson (2001). Pediatric Swallowing and Feeding Assessment and Management (2nd ed.). [employer].   MARK Mike, & KENYA Vazquez (2000). Pre-feeding skills: A comprehensive resource for mealtime development. [employer].

## 2025-06-24 NOTE — REASON FOR VISIT
[Follow-Up] : follow-up for [Clinical Swallow] : clinical swallow evaluation [Mother] : mother [Medical Records] : medical records [FreeTextEntry1] : Dr. Tanya Kendrick, Otolaryngologist, to clinically assess oropharyngeal swallow function swallow function

## 2025-07-03 NOTE — REASON FOR VISIT
[Initial] : initial visit for [Modified Barium Swallow] : modified barium swallow [Mother] : mother [Medical Records] : medical records [FreeTextEntry1] : Dr. Tanya Kendrick, Otolaryngologist, to objectively assess oropharyngeal swallow function in a 7 month old with laryngomalacia and reflux and coughing after feedings.

## 2025-07-03 NOTE — HISTORY OF PRESENT ILLNESS
[FreeTextEntry1] : REPORTED FEEDING/SWALLOWING CONCERNS: Reported by mother to include: -Continued coughing post bottle feedings since CDE  -Denied difficulties with  Puree Consistency (IDDSI Level 4)  BIRTH & MEDICAL HISTORY: Birth and Medical history gathered via parent interview and through review of electronic medical record. Patient born at 36 weeks via  section, twin delivery. No pregnancy nor delivery complications reported. No prior hospitalizations or surgeries reported. Medical history significant for laryngomalacia, gastroesophageal reflux, and feeding difficulties.   Current Respiratory Status: Room Air History of Intubation: None reported Medications: Famotidine 2x/daily Medical allergies: None reported Food Allergies: None reported Food intolerances: None reported  Reported therapeutic history: None reported  Results of Previous Modified Barium Swallow Study (MBSS)/Videofluoroscopic Swallow Studies (VFSS): None reported  Results of Previous Clinical swallow evaluations: Completed outpatient on 2025. Results indicated, "Patient, a 6 month old, full term infant was seen for a Clinical Swallow Evaluation to assess feeding/swallowing difficulties given history of laryngomalacia and gastroesophageal reflux. Patient presents with functional oral and pharyngeal phase with both liquids and swallow; however, noted with delayed throat clear during trial home regimen of home feeding regimen of 3.5 tsp cereal to 5 ounces fluid which corresponds to Mildly Thick Fluids (IDDSI Level 2) according to IDDSI flowtesting. Overt s/s of penetration/aspiration were remediated with increased viscosity of Thickened formula in a ratio of 1 teaspoon cereal to 1 ounce formula via Kemi Level 3 nipple. Given today's evaluation and medical history, plan for oral diet of Thickened formula in a ratio of 1 teaspoon cereal to 1 ounce formula via Kemi Level 3 nipple and for further objective swallow evaluation via Modified Barium Swallow Study. Patient is currently scheduled for 7/3/25. Reviewed MBSS preparation with mother at time of CDE this date."   FEEDING HISTORY: Current Diet (based on the International Dysphagia Diet Standardization initiative [IDDSI]): Current nutritional intake: Exclusive oral diet of Thickened formula in a ratio of 1 teaspoon cereal to 1 ounce formula and Puree Consistency (IDDSI Level 4) Current formula if applicable: Gentlease Feeding utensils: Kemi Level 3 Feeding schedule: 5-6 ounces every 3-5 and 2 purees, in a day will do ~4 bottles and 2 puree feedings (8 ounces of puree) Length of time taken to complete a feeding: <5 minutes Reported Endurance During Meals: Good History of Feeding Tube: None reported

## 2025-07-03 NOTE — ASSESSMENT
[FreeTextEntry1] : ASSESSMENT:  Mental Status: Awake, alert, social smile to clinician  POSTURAL CONTROL & MOVEMENT PATTERN: Head Control: [X] Age Appropriate Trunk Control: [X] Age Appropriate Involuntary movement (chorea, dystonia, fasciculation, myoclonus, spasms, tremor):[X] Not observed  ORAL MOTOR ASSESSMENT: Patient presents with facial symmetry and predominantly closed mouth posture at rest. Age appropriate reflexes: Patient demonstrated the following upon clinician elicitation including transverse tongue, phasic bite, and non nutritive suck Gag reflex: At this time clinician did not attempt to elicit a gag. Abnormal reflexes: Not demonstrated  MODIFIED BARIUM SWALLOW STUDY (MBSS)/VIDEOFLOUROSCOPIC SWALLOW STUDY (VFSS) ASSESSMENT: The patient was assessed semi upright seated in a small tumbleform chair in the lateral plane in the [facility], with Radiologist present. Patient self fed, SLP assisted with feeding,  Mother present throughout. Current Respiratory Status: Room air, Noisy breathing noted at rest in an infant with diagnosed laryngomalacia  Vocal Quality was perceptually judged to be within functional limits based on vocalizations. Secretion Management: Age appropriate There was no coughing, no throat clearing, no wet/gurgly vocal quality prior to PO administration. Special Considerations: As no concerns were reported for  Puree Consistency (IDDSI Level 4) and given functional performance during recent clinical swallow evaluation,  Puree Consistency (IDDSI Level 4) trials were not administered today to limit radiation exposure.   Consistencies Administered/Modality of administration/utensil: [X] Thickened formula in a ratio of 1 teaspoon cereal to 1 ounce formula via Kemi Level 3 nipple  [X] Thickened formula in a ratio of 1 teaspoon cereal to 1 1/2 ounce formula via Kemi Level 3 nipple  Feeding Method: [X] Patient self fed, SLP assisted with feeding Positioning: [X] Seated semi upright in tumble form chair Endurance during meal/trials: [X] Good Patient required minimal encouragement/praise to complete testing/evaluation.   Oral Phases: Labial Closure: _X__Functional labial closure around bottle nipple- no anterior loss Lingual Movements: Coordinated lingual movements   Endurance: _X_Good__Fair__Poor Jaw movement: __X__Rhythmic ___Dysrhythmic/jerky ___Wide excursion ___ Poorly graded movements Transfer: __Reduced anterior posterior transfer __Increased oral transit time _X_Timely propulsion __Maintain bolus __Spillover to the valleculae_X_Spillover to the pyriform sinuses as trials were presented _X_ Complete velopharyngeal closure Clearance:_X_Functional__Lingual Residue__Residue in anterior sulcus___Residue in lateral sulci  Pharyngeal Phase: Initiation of the pharyngeal swallow: Mildly delayed  Hyolaryngeal elevation: Adequate Epiglottic closure: Complete Pharyngeal transit time: Timely Laryngeal Penetration: -No penetration viewed on initial trials. Videofluoroscopy was paused and patient consumed additional trials. Upon reintroduction of videofluoroscopy, patient with silent penetration during the swallow (mild to moderate) with incomplete retrieval for Thickened formula in a ratio of 1 teaspoon cereal to 1 ounce formula Aspiration: -Upon reintroduction of videofluroscopy, contrast evidenced on anterior and posterior tracheal walls indicative of silent aspiration for Thickened formula in a ratio of 1 teaspoon cereal to 1 ounce formula. Patient with cough response upon removal from tumbleform chair (~3 minutes after consuming trial).  Residue:Not viewed Integrity of cricopharyngeal opening: Viewed  Esophageal Phase: Backflow not observed. Please refer to physicians report with regard to details for esophageal stage of swallow.  ROSENBECKS ASPIRATION-PENETRATION SCALE Aspiration - Penetration Scale (Rosenbek et al Dysphagia 11:93-98 (April 1996), Aspiration-Penetration Scale) 1. Material does not enter the airway 2. Material enters the airway, remains above the vocal folds, and is ejected from the airway 3. Material enters the airway, remains above the vocal folds, and is not ejected 4. Material enters the airway, contacts the vocal folds, and is ejected from the airway 5. Material enters the airway, contacts the vocal folds, and is not ejected from the airway 6. Material enters the airway, passes below the vocal folds and is ejected into the larynx or out of the airway 7. Material enters the airway, passes below the vocal folds, and is not ejected from the trachea despite effort 8. Material enters the airway, passes below the vocal folds, and no effort is made to eject  TRIALS ADMINISTERED AND SEVERITY SCALE: 8= Thickened formula in a ratio of 1 teaspoon cereal to 1 ounce formula 1= Thickened formula in a ratio of 1 1/2  teaspoon cereal to 1 ounce formula  PROGNOSIS: Good  EDUCATION: Reviewed results/recommendations with parent who verbalized understanding and agreement. Written preliminary results/recommendations provided.   Contributing Factors to Swallowing Impairment: [X] Premature spillover to the pyriform sinuses [X] Delayed swallow trigger initiation   Impact on safety and functioning: [X] Risk for aspiration   IMPRESSIONS: Patient, a 7 month old female with laryngomalacia and reflux, was seen for a Modified Barium Swallow Study upon referral from her Otolaryngologist to objectively assess oropharyngeal swallow function given reported coughing after bottle feedings.  Patient presents with good fluid expression and timely oral transit, however, incomplete tongue to palate contact resulting in spillover to the pyriform sinuses and mild swallow trigger delay viewed. No penetration or aspiration viewed on initial trials. Videofluoroscopy was paused and patient consumed additional trials. Upon reintroduction of videofluoroscopy, patient with silent penetration during the swallow (mild to moderate) with incomplete retrieval and contrast evidenced on anterior and posterior tracheal walls indicative of silent aspiration for Thickened formula in a ratio of 1 teaspoon cereal to 1 ounce formula. No penetration, aspiration, or stasis viewed for Thickened formula in a ratio of 1 1/2 teaspoon cereal to 1 ounce formula via Kemi Level 3 nipple   Diet/Fluid Recommendations: Continue oral feedings of Puree Consistency (IDDSI Level 4) as per Clinical Swallow Evaluation dated 6/19/2025 and  Initiate oral feedings of Thickened formula in a ratio of 1 1/2 teaspoon cereal to 1 ounce formula via Kemi Level 3 nipple   ADDITIONAL RECOMMENDATIONS: 1. Initiate short-term outpatient dysphagia therapy addressing aforementioned deficits. Recommend 10 sessions. 2. Follow up with Otolaryngology as scheduled given pharyngeal dysphagia 3. Follow up Gastroenterologist as scheduled  Aspiration Precautions: Please adhere to safety precautions as outlined below. Monitor for signs and symptoms of aspiration/penetration such as change of breathing pattern, cough, throat clearing, gurgly/wet voice, color change, fever, pneumonia and upper respiratory infection. If noted: discontinue oral intake and contact physician immediately  TREATMENT PLAN FOR SHORT-TERM OUTPATIENT DYSPHAGIA THERAPY AT THE [facility] TO INCLUDE:  LONG TERM GOALS: Patient will safely tolerate full oral feedings SHORT TERM GOALS: 1. Patient will safely tolerate oral diet of Puree Consistency (IDDSI Level 4) and Thickened formula in a ratio of 1 1/2  teaspoon cereal to 1 ounce formula via Kemi Level 3 nipple without overt signs/ symptoms of penetration/aspiration in 100% of opportunities 2. Patient will demonstrate improved speed of pharyngeal swallow trigger initiation through use of thermal tactile stimulation 3. Patient will demonstrate age appropriate endurance for oral feedings consuming full feeding in 30 minutes or less 4. Patient will complete oral stimulation program to support improved coordination of oromotor movements and respiration 5. Parent will demonstrate understanding of safe feeding guidelines to support safe oral feeding experiences  Aspiration precautions: [X] Monitor for signs and symptoms of aspiration and or laryngeal penetration, such as change of breathing pattern, cough, throat clearing, gurgly/wet voice, color change, fever, pneumonia, and upper respiratory infection. If noted: Discontinue oral intake, provide non-oral nutrition/hydration/meds, and contact physician immediately.  This referral process was reviewed with the parent. No further recommendations were made at this time. Please feel free to contact the Center at [phone], if any additional information is needed.   Amy Salas M.S., CCC-SLP [facility] License# [id number]  References CRISS Khan., & DEIDRE Stinson (2001). Pediatric Swallowing and Feeding Assessment and Management (2nd ed.). [employer].  MARK Mike., & KENYA Vazquez (2000). Pre-feeding skills: A comprehensive resource for mealtime development. [employer].

## 2025-07-16 NOTE — PHYSICAL EXAM
[Well Developed] : well developed [Well Nourished] : well nourished [NAD] : in no acute distress [Alert and Active] : alert and active [Moist & Pink Mucous Membranes] : moist and pink mucous membranes [CTAB] : lungs clear to auscultation bilaterally [Regular Rate and Rhythm] : regular rate and rhythm [Normal S1, S2] : normal S1 and S2 [Soft] : soft  [Normal Bowel Sounds] : normal bowel sounds [No HSM] : no hepatosplenomegaly appreciated [Rectal Exam Deferred] : rectal exam was deferred [Normal Tone] : normal tone [Well-Perfused] : well-perfused [Interactive] : interactive [icteric] : anicteric [Respiratory Distress] : no respiratory distress  [Distended] : non distended [Tender] : non tender [Edema] : no edema [Cyanosis] : no cyanosis [Rash] : no rash [Jaundice] : no jaundice [FreeTextEntry4] : noisy breathing

## 2025-07-16 NOTE — CONSULT LETTER
[Dear  ___] : Dear  [unfilled], [Courtesy Letter:] : I had the pleasure of seeing your patient, [unfilled], in my office today. [Please see my note below.] : Please see my note below. [Consult Closing:] : Thank you very much for allowing me to participate in the care of this patient.  If you have any questions, please do not hesitate to contact me. [Sincerely,] : Sincerely, [DrJuana  ___] : Dr. CARVAJAL [FreeTextEntry3] : Jhonny Godinez DO, MSc   of Comprehensive Airway, Respiratory and Esophageal Team Division of Pediatric Gastroenterology, Liver Disease and Nutrition [facility]

## 2025-07-16 NOTE — ASSESSMENT
[Educated Patient & Family about Diagnosis] : educated the patient and family about the diagnosis [FreeTextEntry1] : 7 month old born 36 weeks and 1 day, twin A, no complications, no NICU stay required, passed meconium in 24 hours of life with tracheomalacia presents for presents for follow-up of GI portion of CARE (comprehensive, airway, respiratory and esophageal) team evaluation. Requires thickening on MBSS.  Recommend: -LEONARD precautions -Increase Famotidine to 0.5mL twice daily -Thicken all bottles, follow SLP recommendations, feeding therapy -Call with questions, concerns, or clinical change -Follow-up in 12 weeks in office or telehealth ok as long as accurate and recent weight available